# Patient Record
Sex: FEMALE | Race: BLACK OR AFRICAN AMERICAN | NOT HISPANIC OR LATINO | URBAN - METROPOLITAN AREA
[De-identification: names, ages, dates, MRNs, and addresses within clinical notes are randomized per-mention and may not be internally consistent; named-entity substitution may affect disease eponyms.]

---

## 2017-03-05 ENCOUNTER — EMERGENCY (EMERGENCY)
Facility: HOSPITAL | Age: 42
LOS: 1 days | Discharge: HOME | End: 2017-03-05
Attending: EMERGENCY MEDICINE | Admitting: EMERGENCY MEDICINE
Payer: COMMERCIAL

## 2017-03-05 VITALS
HEIGHT: 71 IN | HEART RATE: 71 BPM | TEMPERATURE: 98 F | SYSTOLIC BLOOD PRESSURE: 122 MMHG | DIASTOLIC BLOOD PRESSURE: 81 MMHG | WEIGHT: 169.98 LBS | RESPIRATION RATE: 16 BRPM | OXYGEN SATURATION: 100 %

## 2017-03-05 VITALS
OXYGEN SATURATION: 100 % | TEMPERATURE: 98 F | DIASTOLIC BLOOD PRESSURE: 96 MMHG | RESPIRATION RATE: 16 BRPM | SYSTOLIC BLOOD PRESSURE: 117 MMHG | HEART RATE: 69 BPM

## 2017-03-05 PROCEDURE — 99284 EMERGENCY DEPT VISIT MOD MDM: CPT

## 2017-03-05 PROCEDURE — 99283 EMERGENCY DEPT VISIT LOW MDM: CPT | Mod: 25

## 2017-03-05 PROCEDURE — 96372 THER/PROPH/DIAG INJ SC/IM: CPT

## 2017-03-05 RX ORDER — KETOROLAC TROMETHAMINE 30 MG/ML
30 SYRINGE (ML) INJECTION ONCE
Qty: 0 | Refills: 0 | Status: DISCONTINUED | OUTPATIENT
Start: 2017-03-05 | End: 2017-03-05

## 2017-03-05 RX ORDER — DIAZEPAM 5 MG
1 TABLET ORAL
Qty: 9 | Refills: 0 | OUTPATIENT
Start: 2017-03-05 | End: 2017-03-08

## 2017-03-05 RX ORDER — CHOLECALCIFEROL (VITAMIN D3) 125 MCG
0 CAPSULE ORAL
Qty: 0 | Refills: 0 | COMMUNITY

## 2017-03-05 RX ADMIN — Medication 30 MILLIGRAM(S): at 08:33

## 2017-03-05 NOTE — ED PROVIDER NOTE - MEDICAL DECISION MAKING DETAILS
LBP after lifting heavy patient at work, no red flags, so no imaging indicated, no sign of cord or cauda equina. Will tx w/ toradol, give Rx for valium.

## 2017-03-05 NOTE — ED PROVIDER NOTE - OBJECTIVE STATEMENT
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 has tried Tylenol, last at 6 am. Pain is 5/10, worse w/ walking and bending down. Denies any F/C, wt loss, saddle anesthesia, urinary or fecal incontinence, weakness.

## 2017-03-05 NOTE — ED ADULT NURSE NOTE - OBJECTIVE STATEMENT
41y/o F, reported to ED from work. A&Ox3, c/o back pain. Pt reports that yesterday when she was at work she hurt her back lifting a patient. Pt has pain in her lower back 8/10 on pain scale with walking and bending. Pt reports that she treated the pain with Tylenol and icy/hot pack last night. Pt reports taking 2 Tylenol this morning at 6am. Pt reports pain relief with the Tylenol this morning.  Pt reports that the pain radiates down to her buttocks on each side. Pt reports that the pain is sharp when it travels down her back to her buttocks. Pt reports that the pain is 5/10 on pain scale when she is sitting or lying. Pt denies numbness or tingling down her legs. Pt denies difficulty urinating or changes in her urination habits. Pt has increase pain in the lower back in a line across with palpation. Pt denies LOC, H/A, SOB, C/P, N/V/D, abd pain. Pt denies fever or chills. Pt reports hx of Migraines but denies using any medication everyday. Will continue to monitor pt.

## 2017-03-05 NOTE — ED PROVIDER NOTE - CARE PLAN
Principal Discharge DX:	Low back pain  Instructions for follow-up, activity and diet:	1) Please follow-up with your Primary Medical Doctor in 3-5 days. If you need to find a new physician, please call [phone].  2) Return to the Emergency Department if you experiences: fevers, chills, incontinence, worsenign pain, or symptoms that are new or recurrent.  3) If you have any questions or concerns, do not hesitate to contact us at [phone].  4) To alleviate the pain, please rest and take Tylenol 650 mg or Motrin 400 mg once every 6 hours as needed.   5) Please take the Muscle Relaxant Valium as needed for continued pain, but make sure not to drink alcohol, drive, swim, or operate heavy machinery on this medication. Principal Discharge DX:	Low back pain  Instructions for follow-up, activity and diet:	1) Please follow-up with your Primary Medical Doctor in 3-5 days. If you need to find a new physician, please call [phone].  2) Return to the Emergency Department if you experiences: fevers, chills, incontinence, worsenign pain, or symptoms that are new or recurrent.  3) If you have any questions or concerns, do not hesitate to contact us at [phone].  4) To alleviate the pain, please rest and take Tylenol 650 mg or Motrin 400 mg once every 6 hours as needed.   5) Please take the Muscle Relaxant Valium as needed for continued pain, but make sure not to drink alcohol, drive, swim, or operate heavy machinery on this medication.

## 2017-03-05 NOTE — ED PROVIDER NOTE - PLAN OF CARE
1) Please follow-up with your Primary Medical Doctor in 3-5 days. If you need to find a new physician, please call [phone].  2) Return to the Emergency Department if you experiences: fevers, chills, incontinence, worsenign pain, or symptoms that are new or recurrent.  3) If you have any questions or concerns, do not hesitate to contact us at [phone].  4) To alleviate the pain, please rest and take Tylenol 650 mg or Motrin 400 mg once every 6 hours as needed.   5) Please take the Muscle Relaxant Valium as needed for continued pain, but make sure not to drink alcohol, drive, swim, or operate heavy machinery on this medication.

## 2017-03-05 NOTE — ED PROVIDER NOTE - PROGRESS NOTE DETAILS
Pt ambulatory, pain improved w/ medication. Pt drove so will not give muscle relaxant in the ED, but will give Rx.

## 2017-03-05 NOTE — ED PROVIDER NOTE - PHYSICAL EXAMINATION
Physical Exam: young F in mild distress, AAOx3, PERRLA, CTAB, normal rate and regular rhythm, abdomen is soft and NTND, No deformity of extremities, No focal motor or sensory deficits. 5/5 strength in BLE. Negative SLR ~ Prakash Phoenix MD

## 2017-03-05 NOTE — ED PROVIDER NOTE - ATTENDING CONTRIBUTION TO CARE
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  Improved with home APAP.  Today worsening pain despite home APAP.  No loss of sensation, no loss of B/B continence, remains ambulatory.  Pain not radiating.    On exam patient VS WNL, well appearing, RRR, CTABL, neurologically intact.  Lumbar area diffusely TTP without focal midline TTP, some palpable muscle spasm.    Lower back strain without evidence of acute spinal cord emergency, symptomatic treatment and DC.

## 2017-03-09 DIAGNOSIS — M54.5 LOW BACK PAIN: ICD-10-CM

## 2018-07-13 ENCOUNTER — EMERGENCY (EMERGENCY)
Facility: HOSPITAL | Age: 43
LOS: 0 days | Discharge: HOME | End: 2018-07-13
Attending: EMERGENCY MEDICINE
Payer: COMMERCIAL

## 2018-07-13 VITALS
OXYGEN SATURATION: 99 % | WEIGHT: 162.04 LBS | HEIGHT: 71 IN | DIASTOLIC BLOOD PRESSURE: 54 MMHG | SYSTOLIC BLOOD PRESSURE: 116 MMHG | RESPIRATION RATE: 18 BRPM | TEMPERATURE: 99 F | HEART RATE: 90 BPM
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
## 2018-07-13 DIAGNOSIS — M54.2 CERVICALGIA: ICD-10-CM

## 2018-07-13 DIAGNOSIS — M54.9 DORSALGIA, UNSPECIFIED: ICD-10-CM
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
## 2018-07-13 DIAGNOSIS — T14.8XXA OTHER INJURY OF UNSPECIFIED BODY REGION, INITIAL ENCOUNTER: ICD-10-CM

## 2018-07-13 PROCEDURE — 99283 EMERGENCY DEPT VISIT LOW MDM: CPT

## 2018-07-13 RX ORDER — IBUPROFEN 200 MG
1 TABLET ORAL
Qty: 28 | Refills: 0 | OUTPATIENT
Start: 2018-07-13 | End: 2018-07-19

## 2018-07-13 RX ORDER — IBUPROFEN 200 MG
600 TABLET ORAL ONCE
Qty: 0 | Refills: 0 | Status: COMPLETED | OUTPATIENT
Start: 2018-07-13 | End: 2018-07-13

## 2018-07-13 RX ORDER — CYCLOBENZAPRINE HYDROCHLORIDE 10 MG/1
1 TABLET, FILM COATED ORAL
Qty: 15 | Refills: 0 | OUTPATIENT
Start: 2018-07-13 | End: 2018-07-17

## 2018-07-13 RX ADMIN — Medication 600 MILLIGRAM(S): at 18:25
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
## 2019-01-15 ENCOUNTER — RESULT REVIEW (OUTPATIENT)
Age: 44
End: 2019-01-15

## 2019-02-20 ENCOUNTER — APPOINTMENT (OUTPATIENT)
Dept: UROGYNECOLOGY | Facility: CLINIC | Age: 44
End: 2019-02-20
Payer: COMMERCIAL

## 2019-02-20 VITALS
DIASTOLIC BLOOD PRESSURE: 72 MMHG | WEIGHT: 160 LBS | HEIGHT: 71 IN | BODY MASS INDEX: 22.4 KG/M2 | SYSTOLIC BLOOD PRESSURE: 128 MMHG

## 2019-02-20 DIAGNOSIS — R35.0 FREQUENCY OF MICTURITION: ICD-10-CM

## 2019-02-20 DIAGNOSIS — N39.41 URGE INCONTINENCE: ICD-10-CM

## 2019-02-20 DIAGNOSIS — Z82.49 FAMILY HISTORY OF ISCHEMIC HEART DISEASE AND OTHER DISEASES OF THE CIRCULATORY SYSTEM: ICD-10-CM

## 2019-02-20 DIAGNOSIS — Z83.438 FAMILY HISTORY OF OTHER DISORDER OF LIPOPROTEIN METABOLISM AND OTHER LIPIDEMIA: ICD-10-CM

## 2019-02-20 DIAGNOSIS — R39.15 URGENCY OF URINATION: ICD-10-CM

## 2019-02-20 DIAGNOSIS — Z78.9 OTHER SPECIFIED HEALTH STATUS: ICD-10-CM

## 2019-02-20 DIAGNOSIS — N39.3 STRESS INCONTINENCE (FEMALE) (MALE): ICD-10-CM

## 2019-02-20 PROCEDURE — 99244 OFF/OP CNSLTJ NEW/EST MOD 40: CPT | Mod: 25

## 2019-02-20 PROCEDURE — 51701 INSERT BLADDER CATHETER: CPT

## 2019-02-20 RX ORDER — SOLIFENACIN SUCCINATE 5 MG/1
5 TABLET, FILM COATED ORAL DAILY
Qty: 30 | Refills: 5 | Status: ACTIVE | COMMUNITY
Start: 2019-02-20 | End: 1900-01-01

## 2019-02-20 NOTE — DISCUSSION/SUMMARY
[FreeTextEntry1] : Courtney presents with symptoms of mixed urinary incontinence, with a predominant urgency component. We discussed possible etiologies of her symptoms including both stress urinary incontinence and overactive bladder. We reviewed management options for both conditions. I recommend further workup of her urinary symptoms with urodynamic testing. We reviewed behavioral modifications and bladder training. Written and verbal instructions  were provided to her as well. We discussed medications for overactive bladder and reviewed r/b of medications. She would like to try a medication. Vesicare 5 mg eRx. We discussed risks and side effects of anticholinergics. If she develops dry mouth, she will try Biotene. If she develops constipation she will try starting a bowel regimen. If she develops dry eyes, she will try lubricating eye drops. If she is unable to control side effects or experiences different bothersome side effects, she will stop the medication immediately and notify me. Otherwise she will continue the medication until her next appointment. She will return to my office for urodynamics and followup with me to discuss results and management options further.

## 2019-02-20 NOTE — HISTORY OF PRESENT ILLNESS
[Rectal Prolapse] : frequent [Stress Incontinence] : none [Unable To Restrain Bowel Movement] : daily [Urinary Frequency] : none [Feelings Of Urinary Urgency] : daily [Urinary Frequency More Than Twice At Night (Nocturia)] : none [Urinary Tract Infection] : daily [] : years ago [Constipation Obstructed Defecation] : none [de-identified] : uses 2-3 pads daily [FreeTextEntry1] : \par daily fluid intake: 8 oz coffee, 17 oz water\par \par unsure if desires future fertility

## 2019-02-20 NOTE — PROCEDURE
[Fluid Management] : fluid management [Bladder Training] : bladder training [FreeTextEntry1] : Sterile straight catheterization was performed to measure a postvoid residual volume which was 30 cc

## 2019-02-20 NOTE — PHYSICAL EXAM
[No Acute Distress] : in no acute distress [Oriented x3] : oriented to person, place, and time [Normal Memory] : ~T memory was ~L unimpaired [Normal Mood/Affect] : mood and affect are normal [Warm and Dry] : was warm and dry to touch [Normal Gait] : gait was normal [Labia Majora] : were normal [Labia Minora] : were normal [Normal Appearance] : general appearance was normal [Normal] : no abnormalities [Anxiety] : patient is not anxious [Tenderness] : ~T no ~M abdominal tenderness observed [Distended] : not distended [H/Smegaly] : no hepatosplenomegaly [Inguinal LAD] : no adenopathy was noted in the inguinal lymph nodes [de-identified] : no prolapse

## 2019-02-21 ENCOUNTER — RESULT REVIEW (OUTPATIENT)
Age: 44
End: 2019-02-21

## 2019-02-21 PROBLEM — Z82.49 FAMILY HISTORY OF HYPERTENSION: Status: ACTIVE | Noted: 2019-02-21

## 2019-02-21 PROBLEM — Z78.9 CURRENT NON-SMOKER: Status: ACTIVE | Noted: 2019-02-21

## 2019-02-21 PROBLEM — Z83.438 FAMILY HISTORY OF HYPERLIPIDEMIA: Status: ACTIVE | Noted: 2019-02-21

## 2019-02-21 LAB
APPEARANCE: CLEAR
BACTERIA: NEGATIVE
BILIRUB UR QL STRIP: NORMAL
BILIRUBIN URINE: NEGATIVE
BLOOD URINE: NEGATIVE
CLARITY UR: CLEAR
COLLECTION METHOD: NORMAL
COLOR: YELLOW
GLUCOSE QUALITATIVE U: NEGATIVE
GLUCOSE UR-MCNC: NORMAL
HCG UR QL: 1 EU/DL
HGB UR QL STRIP.AUTO: NORMAL
HYALINE CASTS: 0 /LPF
KETONES UR-MCNC: NORMAL
KETONES URINE: NEGATIVE
LEUKOCYTE ESTERASE UR QL STRIP: NORMAL
LEUKOCYTE ESTERASE URINE: NEGATIVE
MICROSCOPIC-UA: NORMAL
NITRITE UR QL STRIP: NORMAL
NITRITE URINE: NEGATIVE
PH UR STRIP: 7
PH URINE: 7
PROT UR STRIP-MCNC: NORMAL
PROTEIN URINE: NEGATIVE
RED BLOOD CELLS URINE: 0 /HPF
SP GR UR STRIP: 1.02
SPECIFIC GRAVITY URINE: 1.02
SQUAMOUS EPITHELIAL CELLS: 2 /HPF
UROBILINOGEN URINE: NORMAL
WHITE BLOOD CELLS URINE: 0 /HPF

## 2019-02-22 ENCOUNTER — RESULT REVIEW (OUTPATIENT)
Age: 44
End: 2019-02-22

## 2019-02-22 LAB — BACTERIA UR CULT: NORMAL

## 2019-03-06 ENCOUNTER — APPOINTMENT (OUTPATIENT)
Dept: MAMMOGRAPHY | Facility: IMAGING CENTER | Age: 44
End: 2019-03-06

## 2019-03-06 ENCOUNTER — APPOINTMENT (OUTPATIENT)
Dept: ULTRASOUND IMAGING | Facility: IMAGING CENTER | Age: 44
End: 2019-03-06

## 2019-03-20 ENCOUNTER — APPOINTMENT (OUTPATIENT)
Dept: UROGYNECOLOGY | Facility: CLINIC | Age: 44
End: 2019-03-20

## 2019-04-17 ENCOUNTER — APPOINTMENT (OUTPATIENT)
Dept: UROGYNECOLOGY | Facility: CLINIC | Age: 44
End: 2019-04-17

## 2022-05-19 NOTE — ED PROVIDER NOTE - CROS ED RESP ALL NEG
negative... Helical Rim Advancement Flap Text: The defect edges were debeveled with a #15 blade scalpel.  Given the location of the defect and the proximity to free margins (helical rim) a double helical rim advancement flap was deemed most appropriate.  Using a sterile surgical marker, the appropriate advancement flaps were drawn incorporating the defect and placing the expected incisions between the helical rim and antihelix where possible.  The area thus outlined was incised through and through with a #15 scalpel blade.  With a skin hook and iris scissors, the flaps were gently and sharply undermined and freed up.

## 2024-02-01 NOTE — ED PROVIDER NOTE - CROS ED EYES ALL NEG
(1) A healthy and balanced diet.   (2) Omega-3 vitamin supplements may provide some benefit.   (3) Practice good sleep practices. Discontinue use of electronic devices after 5 pm to promote better rest.   (4) Engage in regular physical activity and establish an exercise regimen.  (5) Behavioral modification therapy and/or Cognitive Behavioral Therapy recommended for better long-term outcomes.  (6) Abstain from all drug and alcohol use. Limit intake of caffeine as this may worsen or contribute to anxiety. Eliminate caffeine after 12 pm.  (7) Take medications only as directed.  (8) Do not share prescribed medications with anyone.  (9) Lost or stolen medications will not be refilled.  (10) Do not stop or modify medication dosages without first discussing with prescriber.  (11) Notify clinic immediately for any problems, side effects, or other concerns with medications.  (12) Notify the clinic or report to Emergency Medical Services if any signs of allergic reaction or suicidal ideations occur.  (13) Keep follow up appointments as scheduled.    negative...